# Patient Record
Sex: FEMALE | Race: OTHER
[De-identification: names, ages, dates, MRNs, and addresses within clinical notes are randomized per-mention and may not be internally consistent; named-entity substitution may affect disease eponyms.]

---

## 2019-10-07 ENCOUNTER — HOSPITAL ENCOUNTER (EMERGENCY)
Dept: HOSPITAL 61 - ER | Age: 34
Discharge: HOME | End: 2019-10-07
Payer: SELF-PAY

## 2019-10-07 VITALS — HEIGHT: 59 IN | BODY MASS INDEX: 28.22 KG/M2 | WEIGHT: 140 LBS

## 2019-10-07 VITALS — SYSTOLIC BLOOD PRESSURE: 112 MMHG | DIASTOLIC BLOOD PRESSURE: 63 MMHG

## 2019-10-07 DIAGNOSIS — M79.672: ICD-10-CM

## 2019-10-07 DIAGNOSIS — M79.671: Primary | ICD-10-CM

## 2019-10-07 PROCEDURE — 99281 EMR DPT VST MAYX REQ PHY/QHP: CPT

## 2019-10-07 NOTE — PHYS DOC
Past Medical History


Past Medical History:  No Pertinent History


Past Surgical History:  No Surgical History


Alcohol Use:  None


Drug Use:  None





Adult General


Chief Complaint


Chief Complaint:  FOOT INJURY PAIN





HPI


HPI





Patient is a 33  year old  female, accompanied by her , who 

presents to the emergency department with complaints of bilateral foot pain for 

3 weeks. Patient states she is a  and is on her feet all day. She 

states that there has been no injury or swelling to her feet. She states the 

pain is worse when she first gets out of bed or gets up from standing. She 

denies any numbness, tingling, or weakness of her lower extremities. Patient 

also complains of dry skin to the left side of her abdomen that is itchy but 

denies any rash. Patient also denies any new foods, medications, detergents, or 

fragrances. She currently rates her pain an 8 out of 10 on the pain scale, there

are no alleviating factors pain increases with weightbearing. Patient was 

[place]-speaking only and to see her, and the motify  line was used 

to converse with this patient. She denies any medical or surgical history.





Review of Systems


Review of Systems





Constitutional: Denies fever or chills []


Eyes: Denies redness, or eye pain []


Musculoskeletal: see HPI


Integument: see HPI


Neurologic: Denies headache, focal weakness or sensory changes []








Complete systems were reviewed and found to be within normal limits, except as 

documented in this note.





Allergies


Allergies





Allergies








Coded Allergies Type Severity Reaction Last Updated Verified


 


  No Known Drug Allergies    10/7/19 No











Physical Exam


Physical Exam





Constitutional: Well developed, well nourished, no acute distress, non-toxic 

appearance. []


HENT: Normocephalic, atraumatic, bilateral external ears normal, nose normal. []


Eyes: PERRLA, EOMI, conjunctiva normal, no discharge. [] 


Neck: Normal range of motion, no stridor. [] 


Cardiovascular:Heart rate regular rhythm


Lungs & Thorax:  Respirations even and unlabored, no retractions, no respiratory

 distress


Skin: Warm, dry, no erythema, dry skin noted to abdomen consistent with dry skin

 dermatitis


Extremities: No  bony tenderness, no cyanosis, no clubbing, ROM intact, no 

edema; tenderness to arch and heels of bilateral feet consistent with plantar 

fascitis  [] 


Neurologic: Alert and oriented X 3, normal motor function, normal sensory 

function, no focal deficits noted. []


Psychologic: Affect normal, judgement normal, mood normal. []





Current Patient Data


Vital Signs





                                   Vital Signs








  Date Time  Temp Pulse Resp B/P (MAP) Pulse Ox O2 Delivery O2 Flow Rate FiO2


 


10/7/19 13:27 98.2 84 16 112/63 (79) 100 Room Air  





 98.2       











EKG


EKG


[]





Radiology/Procedures


Radiology/Procedures


[]





Course & Med Decision Making


Course & Med Decision Making


Pertinent Labs and Imaging studies reviewed. (See chart for details)


dx: medical screening exam 


A medical screening exam was performed, patient was found to have no emergent 

medical condition. The plan of care would've included: Prescription medications.

 However, the patient eloped after talking with registration.


[]


[]





Dragon Disclaimer


Dragon Disclaimer


This electronic medical record was generated, in whole or in part, using a voice

 recognition dictation system.





Departure


Departure


Impression:  


   Primary Impression:  


   Encounter for medical screening examination


Disposition:  01 HOME, SELF-CARE (patient eloped after talking to registration)


Condition:  STABLE


Referrals:  


NO PCP (PCP)











DANIKA ROBLES APRN        Oct 7, 2019 18:26